# Patient Record
(demographics unavailable — no encounter records)

---

## 2024-11-04 NOTE — END OF VISIT
[FreeTextEntry3] : I, Baylee Lynne, acted solely as a scribe for Dr. Mohamud Callejas on this date 11/04/2024.  I, Mohamud Callejas MD, personally performed the services described in the documentation, reviewed the documentation recorded by the scribe in my presence and it accurately and completely records my words and actions.

## 2024-11-04 NOTE — HISTORY OF PRESENT ILLNESS
[___ Weeks Post Op] : [unfilled] weeks post op [0] : no pain reported [Xray (Date:___)] : [unfilled] Xray -  [de-identified] : POS:  Complex primary right total knee arthroplasty.  Computer-assisted tibial resection, OrthAlign procedure. DOS: 08/22/2024 [de-identified] : Ivan is a 62-year-old male that does present today for follow-up evaluation status post right total knee arthroplasty on 8/22/2024.  He is overall doing well.  He recently completed physical therapy and has transition to home exercise program.  He presents with a cane today, but he is able to perform his ADLs without issue.  He denies any mechanical symptoms or instability.  No fever, chills or other constitutional symptoms. [de-identified] : Examination of the right knee reveals well-healed surgical incision.  There is no joint effusion.  No warmth erythema.  Range of motion is 0 to 100 degrees of flexion.  Knee is stable to varus and valgus stress.  Stable to anterior drawer.  5 out of 5 strength. [de-identified] : X rays were taken of the RIGHT knee AP, LATERAL, SUNRISE VIEWS. No fracture, dislocation or loose body.   There is evidence of right total knee arthroplasty with implants in good position without evidence of hardware failure, wear or subsidence. [de-identified] : 62-year-old male status post right total knee arthroplasty on 8/22/2024.  He is overall doing well.  His implants are stable on x-ray.  He exhibits near full range of motion and strength. [de-identified] : He will continue with a low impact home exercise program.  We reviewed the importance of antibiotic prophylaxis for dental work for the first 2 years from the time of surgery, although he does have lifelong antibiotic prophylactics precautions secondary to previous history of endocarditis. He will follow-up near the 1 year anniversary from the date of surgery.  All questions answered.

## 2024-11-25 NOTE — HISTORY OF PRESENT ILLNESS
[de-identified] : Ivan is a 62-year-old male that presents today for follow-up evaluation status post left total knee arthroplasty on 11/28/2023.  Overall, he is doing quite well.  He is also status post right total knee arthroplasty in August 2024. Regarding the left knee he denies any significant pain.  No stiffness.  No mechanical symptoms or instability.  He has returned to all of his ADLs and recreational activity without issue.

## 2024-11-25 NOTE — PHYSICAL EXAM
[de-identified] : GENERAL APPEARANCE: Well nourished and hydrated, pleasant, alert, and oriented x 3. Appears their stated age.  HEENT: Normocephalic, extraocular eye motion intact. Nasal septum midline. Oral cavity clear. External auditory canal clear.  RESPIRATORY: Breath sounds clear and audible in all lobes. No wheezing, No accessory muscle use. CARDIOVASCULAR: No apparent abnormalities. No lower leg edema. No varicosities. Pedal pulses are palpable. NEUROLOGIC: Sensation is normal, no muscle weakness in the upper or lower extremities. DERMATOLOGIC: No apparent skin lesions, moist, warm, no rash. SPINE: Cervical spine appears normal and moves freely; thoracic spine appears normal and moves freely; lumbosacral spine appears normal and moves freely, normal, nontender. MUSCULOSKELETAL: Hands, wrists, and elbows are normal and move freely, shoulders are normal and move freely.  PSYCHIATRIC: Oriented to person, place, and time, insight and judgement were intact and the affect was normal.  Examination left knee reveals well-healed surgical incision, no effusion, range of motion 0 to 120 degrees of flexion, no gross instability, 5 out of 5 strength. [de-identified] : X rays were taken of the LEFT knee AP, LATERAL, SUNRISE VIEWS. No fracture, dislocation.  There is evidence of left total knee arthroplasty with implants in good position without evidence of hardware failure, wear or subsidence.

## 2024-11-25 NOTE — END OF VISIT
[FreeTextEntry3] : I, Baylee Lynne, acted solely as a scribe for Dr. Mohamud Callejas on this date 11/25/2024.  I, Mohamud Callejas MD, personally performed the services described in the documentation, reviewed the documentation recorded by the scribe in my presence and it accurately and completely records my words and actions.

## 2024-11-25 NOTE — DISCUSSION/SUMMARY
[Medication Risks Reviewed] : Medication risks reviewed [de-identified] : This is a 62 year old M pt presents today for 1 year post-op follow-up status post left total knee arthroplasty on 11/28/2023.  I reassured the pt that his implants are stable with no evidence of loosening or wear. Pt understands the importance of prophylaxis for invasive dental procedures. He should continue to do low impact exercises. F/u in 1 year for radiographic surveillance.

## 2025-03-12 NOTE — HEALTH RISK ASSESSMENT
[Good] : ~his/her~  mood as  good [Yes] : Yes [No] : In the past 12 months have you used drugs other than those required for medical reasons? No [One fall no injury in past year] : Patient reported one fall in the past year without injury [0] : 2) Feeling down, depressed, or hopeless: Not at all (0) [PHQ-2 Negative - No further assessment needed] : PHQ-2 Negative - No further assessment needed [Never] : Never [Patient reported colonoscopy was abnormal] : Patient reported colonoscopy was abnormal [With Significant Other] : lives with significant other [On disability] : on disability [] :  [# Of Children ___] : has [unfilled] children [Feels Safe at Home] : Feels safe at home [Fully functional (bathing, dressing, toileting, transferring, walking, feeding)] : Fully functional (bathing, dressing, toileting, transferring, walking, feeding) [Fully functional (using the telephone, shopping, preparing meals, housekeeping, doing laundry, using] : Fully functional and needs no help or supervision to perform IADLs (using the telephone, shopping, preparing meals, housekeeping, doing laundry, using transportation, managing medications and managing finances) [Smoke Detector] : smoke detector [Safety elements used in home] : safety elements used in home [Seat Belt] :  uses seat belt [With Patient/Caregiver] : , with patient/caregiver [Designated Healthcare Proxy] : Designated healthcare proxy [Name: ___] : Health Care Proxy's Name: [unfilled]  [Relationship: ___] : Relationship: [unfilled] [de-identified] : rare [de-identified] : GYM 4/ week> treadmill, stationary bike [de-identified] : Fat free diet [ICL9Offec] : 0 [Reports changes in hearing] : Reports no changes in hearing [Reports changes in vision] : Reports no changes in vision [Reports changes in dental health] : Reports no changes in dental health [TB Exposure] : is not being exposed to tuberculosis [ColonoscopyDate] : 08/24 [ColonoscopyComments] : Polypectomy> repeat in 5 years [de-identified] : on disability since 2015 due to vertigo [AdvancecareDate] : 03/25

## 2025-03-12 NOTE — HISTORY OF PRESENT ILLNESS
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 Hx Shoulder surgery in 02/13. States has post op Endocarditis, treated with gentamicin, with toxicity to vestibular system.  07/13 Mitral Valve replacement. S/P Left Hip Arthroplasty in 03/20.Complicated with infection, requiring I&D in 04/20. Recent Dx Paroxismal A. Fib, seen by cardiology Dr López, Now on Xarelto. Concern about weight. refused surgery.Meds not cover. S/P Left TKR 11/28/23 with Dr Callejas. S/P RT TKR 8/22/24 with Dr Callejas.   reports > 1 week ago sinus congestion, mucus greenish mucus with blood.

## 2025-03-12 NOTE — ASSESSMENT
[FreeTextEntry1] :  # Sinus congestion: -Fluticasone -Advise Zyrtec or Allegra.  # HTN/ A.Fib - continue Losartan 100mg QD, metoprolol ER 25mg QD, Chlorthalidone 25mg QD -On Xarelto -F.up with Cardiology Dr López  HLD - Continue Atorvastatin 10mg QD, Asa 81mg QD  # Sleep apnea - continue CPAP - Continue follow up with pulmonary -Will try Zepbound  # MORBID Obesity - Meds not cover in the past -Diet advise and d/w pt -Nutritionist referral. - Will try Zepbound.  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 Grimes -On Disability since 2015  Tinnitus - will refer to audiologist.  Posterior neck hump/mass - Possible buffalo hump from steroid use? - Xray of c-spine> no mass - Patient unable to tolerate CT scan or MRI.  HCM: - BLood and UA today -Colonoscopy: 08/2024> Polypectomy> Repeat Colonoscopy 2028 -Immunization: RSV, COVID and Flu UTD 2023. -Advise Shingrix at Pharmacy.

## 2025-03-12 NOTE — PHYSICAL EXAM
[No Acute Distress] : no acute distress [Well Nourished] : well nourished [Well Developed] : well developed [Well-Appearing] : well-appearing [Normal Sclera/Conjunctiva] : normal sclera/conjunctiva [PERRL] : pupils equal round and reactive to light [EOMI] : extraocular movements intact [Normal Outer Ear/Nose] : the outer ears and nose were normal in appearance [Normal Oropharynx] : the oropharynx was normal [No JVD] : no jugular venous distention [No Lymphadenopathy] : no lymphadenopathy [Supple] : supple [Thyroid Normal, No Nodules] : the thyroid was normal and there were no nodules present [No Respiratory Distress] : no respiratory distress  [No Accessory Muscle Use] : no accessory muscle use [Clear to Auscultation] : lungs were clear to auscultation bilaterally [Normal Rate] : normal rate  [Regular Rhythm] : with a regular rhythm [Normal S1, S2] : normal S1 and S2 [No Murmur] : no murmur heard [No Carotid Bruits] : no carotid bruits [No Abdominal Bruit] : a ~M bruit was not heard ~T in the abdomen [No Varicosities] : no varicosities [Pedal Pulses Present] : the pedal pulses are present [No Edema] : there was no peripheral edema [No Palpable Aorta] : no palpable aorta [No Extremity Clubbing/Cyanosis] : no extremity clubbing/cyanosis [Soft] : abdomen soft [Non Tender] : non-tender [Non-distended] : non-distended [No Masses] : no abdominal mass palpated [No HSM] : no HSM [Normal Bowel Sounds] : normal bowel sounds [Normal Posterior Cervical Nodes] : no posterior cervical lymphadenopathy [Normal Anterior Cervical Nodes] : no anterior cervical lymphadenopathy [No CVA Tenderness] : no CVA  tenderness [No Spinal Tenderness] : no spinal tenderness [No Joint Swelling] : no joint swelling [Grossly Normal Strength/Tone] : grossly normal strength/tone [No Rash] : no rash [Coordination Grossly Intact] : coordination grossly intact [No Focal Deficits] : no focal deficits [Normal Gait] : normal gait [Deep Tendon Reflexes (DTR)] : deep tendon reflexes were 2+ and symmetric [Normal Affect] : the affect was normal [Normal Insight/Judgement] : insight and judgment were intact [de-identified] : MORBID obese

## 2025-03-12 NOTE — COUNSELING
[Fall prevention counseling provided] : Fall prevention counseling provided [Adequate lighting] : Adequate lighting [Behavioral health counseling provided] : Behavioral health counseling provided [Potential consequences of obesity discussed] : Potential consequences of obesity discussed [Benefits of weight loss discussed] : Benefits of weight loss discussed [Structured Weight Management Program suggested:] : Structured weight management program suggested [Encouraged to maintain food diary] : Encouraged to maintain food diary [Encouraged to increase physical activity] : Encouraged to increase physical activity [Encouraged to use exercise tracking device] : Encouraged to use exercise tracking device [Target Wt Loss Goal ___] : Weight Loss Goals: Target weight loss goal [unfilled] lbs [Weigh Self Weekly] : weigh self weekly [Decrease Portions] : decrease portions [Keep Food Diary] : keep food diary [FreeTextEntry4] : 15 [None] : None [Good understanding] : Patient has a good understanding of lifestyle changes and steps needed to achieve self management goal

## 2025-04-03 NOTE — END OF VISIT
[Time Spent: ___ minutes] : I have spent [unfilled] minutes of time on the encounter which excludes teaching and separately reported services. [FreeTextEntry4] :  I, Liborio Dewitt, am scribing for and in the presence of  in the following sections HISTORY OF PRESENT ILLNESSS, PAST MEDICAL/FAMILY/SOCIAL HISTORY; REVIEW OF SYSTEMS; VITAL SIGNS; PHYSICAL EXAM; ASSESSMENT/PLAN [FreeTextEntry3] : I, Gyu Ogden, personally performed the services described in this documentation. All medical record entries made by the scribe were at my direction and in my presence. I have reviewed the chart and agree that the record reflects my personal performance and is accurate and complete.            I was present for the above evaluation and agree with the history, physical examination, assessment and plan as above.

## 2025-04-03 NOTE — CARDIOLOGY SUMMARY
[de-identified] : 4/3/25 sinus rhythm 68 bpm, left axis, prolonged  first degree AVB, LBBB [de-identified] : TTE 4/2/22 1. Technically difficult study. 2. Normal global left ventricular systolic function. 3. Severely increased left ventricular internal cavity size. 4. Left ventricular ejection fraction, by visual estimation, is 50 to 55%. 5. LV EF by Biplane MOD = 53%. 6. Entire septum is abnormal as described above. 7. Abnormal septal motion consistent with post-operative status. 8. The left ventricular diastolic function could not be assessed in this study. 9. #31 Willis II porcine bioprosthesis is present in the mitral position. 10. Mitral prosthesis is functioning normally. 11. There is no evidence of pericardial effusion. 12. No significant change from previou study dated 1/15/2021. [de-identified] : LHC nonobstructive mild CAD, LVEF 55%, LVEDP 16mmHg

## 2025-04-03 NOTE — PHYSICAL EXAM
[Well Developed] : well developed [Well Nourished] : well nourished [No Acute Distress] : no acute distress [Obese] : obese [Normal Conjunctiva] : normal conjunctiva [Normal Venous Pressure] : normal venous pressure [No Carotid Bruit] : no carotid bruit [Normal S1, S2] : normal S1, S2 [No Rub] : no rub [No Gallop] : no gallop [Clear Lung Fields] : clear lung fields [Good Air Entry] : good air entry [No Respiratory Distress] : no respiratory distress  [Soft] : abdomen soft [Non Tender] : non-tender [No Masses/organomegaly] : no masses/organomegaly [Normal Bowel Sounds] : normal bowel sounds [Abnormal Gait] : abnormal gait [No Edema] : no edema [No Cyanosis] : no cyanosis [No Clubbing] : no clubbing [No Varicosities] : no varicosities [No Rash] : no rash [No Skin Lesions] : no skin lesions [Moves all extremities] : moves all extremities [No Focal Deficits] : no focal deficits [Normal Speech] : normal speech [Alert and Oriented] : alert and oriented [Normal memory] : normal memory [Murmur] : murmur [de-identified] : systolic  [de-identified] : ambulates with a cane

## 2025-04-03 NOTE — CARDIOLOGY SUMMARY
[de-identified] : 4/3/25 sinus rhythm 68 bpm, left axis, prolonged  first degree AVB, LBBB [de-identified] : TTE 4/2/22 1. Technically difficult study. 2. Normal global left ventricular systolic function. 3. Severely increased left ventricular internal cavity size. 4. Left ventricular ejection fraction, by visual estimation, is 50 to 55%. 5. LV EF by Biplane MOD = 53%. 6. Entire septum is abnormal as described above. 7. Abnormal septal motion consistent with post-operative status. 8. The left ventricular diastolic function could not be assessed in this study. 9. #31 Willis II porcine bioprosthesis is present in the mitral position. 10. Mitral prosthesis is functioning normally. 11. There is no evidence of pericardial effusion. 12. No significant change from previou study dated 1/15/2021. [de-identified] : LHC nonobstructive mild CAD, LVEF 55%, LVEDP 16mmHg

## 2025-04-03 NOTE — PHYSICAL EXAM
[Well Developed] : well developed [Well Nourished] : well nourished [No Acute Distress] : no acute distress [Obese] : obese [Normal Conjunctiva] : normal conjunctiva [Normal Venous Pressure] : normal venous pressure [No Carotid Bruit] : no carotid bruit [Normal S1, S2] : normal S1, S2 [No Rub] : no rub [No Gallop] : no gallop [Clear Lung Fields] : clear lung fields [Good Air Entry] : good air entry [No Respiratory Distress] : no respiratory distress  [Soft] : abdomen soft [Non Tender] : non-tender [No Masses/organomegaly] : no masses/organomegaly [Normal Bowel Sounds] : normal bowel sounds [Abnormal Gait] : abnormal gait [No Edema] : no edema [No Cyanosis] : no cyanosis [No Clubbing] : no clubbing [No Varicosities] : no varicosities [No Rash] : no rash [No Skin Lesions] : no skin lesions [Moves all extremities] : moves all extremities [No Focal Deficits] : no focal deficits [Normal Speech] : normal speech [Alert and Oriented] : alert and oriented [Normal memory] : normal memory [Murmur] : murmur [de-identified] : systolic  [de-identified] : ambulates with a cane

## 2025-04-03 NOTE — DISCUSSION/SUMMARY
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   He has had at least two known episodes of paroxysmal AF over the last year. During these episodes, he is mildly symptomatic with palpitation. Given his LBBB and low normal LV function, he is at risk for progressive CHF in setting of AF, particularly if AF burden increases.  We discussed atrial fibrillation in detail, associated risks and morbidities, and management options including continued rate control, antiarrhythmic medications, and catheter ablation. Given his mild baseline sinus bradycardia and conduction disease, he has limited good options for antiarrhythmic medications. We discussed the risks and benefits of AF ablation in detail, including procedure related risks. For now he will continue current management, and will reconsider ablation if his AF burden or symptoms worsen. For now, I asked the patient to continue monitoring with his Apple Watch.   He has a low-moderate thromboembolic risk profile with a [id number] = 1-2 (htn, CMP/CHF). He is currently on anticoagulation with Xarelto, and is tolerating it well, and will continue.  He expressed some concern about risks of long-term anticoagulation. We discussed potential enrollment in [employer] clinical trial evaluating LAAO as altnerative to long-term OAC in "low bleeding risk" pts.   Finally he has chronic LBBB, and has had low normal LV function with LV dilatation. He has NYHA class I-II symptoms. If LV function deteriorates, CHF symptoms worsen, or AV block occurs, a CRT device may ultimately be indicated.  He has a TTE scheduled for Monday with Dr López  Recommendations: -continue current medications including anticoagulation and BB for now.  -Continue apple watch monitoring -t/c AF ablation if burden or symptoms worsen -TTE scheduled with Dr. López monday 4/7 - EP follow up 4 months or prn

## 2025-04-03 NOTE — END OF VISIT
[Time Spent: ___ minutes] : I have spent [unfilled] minutes of time on the encounter which excludes teaching and separately reported services. [FreeTextEntry4] :  I, Liborio Dewitt, am scribing for and in the presence of  in the following sections HISTORY OF PRESENT ILLNESSS, PAST MEDICAL/FAMILY/SOCIAL HISTORY; REVIEW OF SYSTEMS; VITAL SIGNS; PHYSICAL EXAM; ASSESSMENT/PLAN [FreeTextEntry3] : I, Guy Ogden, personally performed the services described in this documentation. All medical record entries made by the scribe were at my direction and in my presence. I have reviewed the chart and agree that the record reflects my personal performance and is accurate and complete.            I was present for the above evaluation and agree with the history, physical examination, assessment and plan as above.

## 2025-04-03 NOTE — DISCUSSION/SUMMARY
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   He has had at least two known episodes of paroxysmal AF over the last year. During these episodes, he is mildly symptomatic with palpitation. Given his LBBB and low normal LV function, he is at risk for progressive CHF in setting of AF, particularly if AF burden increases.  We discussed atrial fibrillation in detail, associated risks and morbidities, and management options including continued rate control, antiarrhythmic medications, and catheter ablation. Given his mild baseline sinus bradycardia and conduction disease, he has limited good options for antiarrhythmic medications. We discussed the risks and benefits of AF ablation in detail, including procedure related risks. For now he will continue current management, and will reconsider ablation if his AF burden or symptoms worsen. For now, I asked the patient to continue monitoring with his Apple Watch.   He has a low-moderate thromboembolic risk profile with a [id number] = 1-2 (htn, CMP/CHF). He is currently on anticoagulation with Xarelto, and is tolerating it well, and will continue.  He expressed some concern about risks of long-term anticoagulation. We discussed potential enrollment in [employer] clinical trial evaluating LAAO as altnerative to long-term OAC in "low bleeding risk" pts.   Finally he has chronic LBBB, and has had low normal LV function with LV dilatation. He has NYHA class I-II symptoms. If LV function deteriorates, CHF symptoms worsen, or AV block occurs, a CRT device may ultimately be indicated.  He has a TTE scheduled for Monday with Dr López  Recommendations: -continue current medications including anticoagulation and BB for now.  -Continue apple watch monitoring -t/c AF ablation if burden or symptoms worsen -TTE scheduled with Dr. López monday 4/7 - EP follow up 4 months or prn

## 2025-04-03 NOTE — HISTORY OF PRESENT ILLNESS
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   Had was first diagnosed with AF during a knee replacement in August of 2024, and was started on anticoagulation with Xarelto (in addition to ASA). He has also been on Toprol 25mg qd.  He also had an episode of AF in January 2025. He reports that during his AF episodes he experiences heart "fluttering" and "racing". He denies dizziness, SOB, syncope or near syncope. HE uses his apple watch which has notified him of AF during these episodes.   He has history of MV replacement and LBBB. cath in 2013 showed normal coronaries. TTE revealed LV dilatation with preserved LV function. He has not had dyspnea but has limited exercise capacity due to orthopedic limitations.   He does have ataxia and gait disturbance. He has not had traumatic falls.  He has been tolerating anticoagulation well without complaints of bleeding or bruising.   ECG today reveals sinus rhythm 68 bpm, left axis, prolonged  first degree AVB, LBBB with  ms.   current meds include ASA 81, Xarelto, Toprol 25 mg, chlorthalidoe, losartan, atorvastatin